# Patient Record
Sex: FEMALE | Race: WHITE
[De-identification: names, ages, dates, MRNs, and addresses within clinical notes are randomized per-mention and may not be internally consistent; named-entity substitution may affect disease eponyms.]

---

## 2019-06-30 ENCOUNTER — HOSPITAL ENCOUNTER (INPATIENT)
Dept: HOSPITAL 74 - JERFT | Age: 29
LOS: 1 days | Discharge: HOME | DRG: 195 | End: 2019-07-01
Attending: INTERNAL MEDICINE | Admitting: INTERNAL MEDICINE
Payer: COMMERCIAL

## 2019-06-30 VITALS — BODY MASS INDEX: 32.5 KG/M2

## 2019-06-30 DIAGNOSIS — J18.9: Primary | ICD-10-CM

## 2019-06-30 DIAGNOSIS — N20.0: ICD-10-CM

## 2019-06-30 DIAGNOSIS — N83.202: ICD-10-CM

## 2019-06-30 LAB
ANION GAP SERPL CALC-SCNC: 7 MMOL/L (ref 8–16)
APPEARANCE UR: (no result)
BASOPHILS # BLD: 0.2 % (ref 0–2)
BILIRUB UR STRIP.AUTO-MCNC: NEGATIVE MG/DL
BUN SERPL-MCNC: 8.7 MG/DL (ref 7–18)
CALCIUM SERPL-MCNC: 9.1 MG/DL (ref 8.5–10.1)
CHLORIDE SERPL-SCNC: 104 MMOL/L (ref 98–107)
CO2 SERPL-SCNC: 28 MMOL/L (ref 21–32)
COLOR UR: (no result)
CREAT SERPL-MCNC: 0.8 MG/DL (ref 0.55–1.3)
DEPRECATED RDW RBC AUTO: 12.4 % (ref 11.6–15.6)
EOSINOPHIL # BLD: 1.1 % (ref 0–4.5)
GLUCOSE SERPL-MCNC: 112 MG/DL (ref 74–106)
HCT VFR BLD CALC: 39.7 % (ref 32.4–45.2)
HGB BLD-MCNC: 13.5 GM/DL (ref 10.7–15.3)
KETONES UR QL STRIP: (no result)
LEUKOCYTE ESTERASE UR QL STRIP.AUTO: NEGATIVE
LYMPHOCYTES # BLD: 14.2 % (ref 8–40)
MCH RBC QN AUTO: 30.6 PG (ref 25.7–33.7)
MCHC RBC AUTO-ENTMCNC: 34.1 G/DL (ref 32–36)
MCV RBC: 89.8 FL (ref 80–96)
MONOCYTES # BLD AUTO: 7.7 % (ref 3.8–10.2)
NEUTROPHILS # BLD: 76.8 % (ref 42.8–82.8)
NITRITE UR QL STRIP: NEGATIVE
PH UR: 5.5 [PH] (ref 5–8)
PLATELET # BLD AUTO: 252 K/MM3 (ref 134–434)
PMV BLD: 8.4 FL (ref 7.5–11.1)
POTASSIUM SERPLBLD-SCNC: 3.7 MMOL/L (ref 3.5–5.1)
PROT UR QL STRIP: (no result)
PROT UR QL STRIP: NEGATIVE
RBC # BLD AUTO: 4.42 M/MM3 (ref 3.6–5.2)
SODIUM SERPL-SCNC: 138 MMOL/L (ref 136–145)
SP GR UR: 1.02 (ref 1.01–1.03)
UROBILINOGEN UR STRIP-MCNC: 1 MG/DL (ref 0.2–1)
WBC # BLD AUTO: 7.1 K/MM3 (ref 4–10)

## 2019-06-30 NOTE — PDOC
History of Present Illness





- General


Chief Complaint: Pain


Stated Complaint: FEVER


Time Seen by Provider: 19 19:02


History Source: Patient


Exam Limitations: No Limitations





- History of Present Illness


Initial Comments: 





19 19:41


Patient is a 28-year-old female with no past medical history here with 

complaints of fever x 5 days.  States was monitoring her temperature because 

she had been feeling ill x 5 days.  Today was took sick - feeling weak, fatigue 

to get out of bed so took her temp and it was 104, associated with night sweats 

and so has come to the emergency room for evaluation.  She has not eaten or 

drank today.  States she went to urgent care - [facility] yesterday, labs were done 

no findings and so was discharged home.  States 5 years ago she had a fever 

like this for 2 weeks and admitted to the hospital but work up was 

inconclusive.  No tick bites, no camping, Denies sore throat, rash, ear ache, 

abd pain, n/v/d, dysuria.  





PMD:  Dr. Verónica Camarena


PMHX: as above


PSOCHX:  neg cig, etoh, drug


ALL:  NKDA





GENERAL/CONSTITUTIONAL:  (+) fever or chills. (+) weakness. No weight change.


HEAD, EYES, EARS, NOSE AND THROAT: No change in vision. No ear pain or 

discharge. No sore throat.


CARDIOVASCULAR: No chest pain or shortness of breath.


RESPIRATORY: No cough, wheezing, or hemoptysis.


GASTROINTESTINAL: No nausea, vomiting, diarrhea or constipation. No rectal 

bleeding.


GENITOURINARY: No dysuria, frequency, or change in urination.


MUSCULOSKELETAL: No joint or muscle swelling or pain. No neck or back pain.


SKIN AND BREASTS: No rash or easy bruising.


NEUROLOGIC: No headache, vertigo, loss of consciousness, or loss of sensation.


PSYCHIATRIC: No depression or anxiety.


ENDOCRINE: No increased thirst. No abnormal weight change.


HEMATOLOGIC/LYMPHATIC: No anemia, easy bleeding, or history of blood clots.


ALLERGIC/IMMUNOLOGIC: No hives or skin allergy. No latex allergy.











GENERAL: The patient is awake, alert, and fully oriented, in no acute distress.


HEAD: Normal with no signs of trauma.


EYES: Pupils equal, round and reactive to light, extraocular movements intact, 

sclera anicteric, conjunctiva clear.


ENT: Ears normal, nares patent, oropharynx clear without exudates.  Moist 

mucous membranes.


NECK: Normal range of motion, supple without lymphadenopathy, JVD, or masses.


LUNGS: Breath sounds equal, clear to auscultation bilaterally.  No wheezes, and 

no crackles.


HEART: Regular rate and rhythm, normal S1 and S2 without murmur, rub.


ABDOMEN: Soft, mild RLQ tenderness, normoactive bowel sounds.  No guarding, no 

rebound.  No masses.


EXTREMITIES: Normal range of motion, no edema.  No clubbing or cyanosis. No 

cords, erythema, or tenderness.


NEUROLOGICAL: Cranial nerves II through XII grossly intact.  Normal speech, 

normal gait.


PSYCH: Normal mood, normal affect.


SKIN: Warm, Dry, normal turgor, no rashes or lesions noted.














Past History





- Past Medical History


Allergies/Adverse Reactions: 


 Allergies











Allergy/AdvReac Type Severity Reaction Status Date / Time


 


No Known Allergies Allergy   Verified 19 19:01











Home Medications: 


Ambulatory Orders





Metronidazole 500 mg PO TID #12 tablet 10/03/14 


levoFLOXacin [Levaquin -] 500 mg PO DAILY #4 tablet 10/03/14 


Non-Formulary 0 mg .ROUTE ASDIR 10/07/14 


Norgestimate-Ethinyl Estradiol [Ortho Tri-Cyclen Lo Tablet] 1 tab PO DAILY 10/07

/14 


Ranitidine [Zantac -] 150 mg PO DAILY #0 tablet 10/07/14 








Anemia: No


Asthma: No


Cancer: No


Cardiac Disorders: No


CVA: No


COPD: No


CHF: No


Dementia: No


Diabetes: No


GI Disorders: No


 Disorders: No


HTN: No


Hypercholesterolemia: No


Liver Disease: No


Seizures: No


Thyroid Disease: No





- Surgical History


Abdominal Surgery: No


Appendectomy: No


Cardiac Surgery: No


Cholecystectomy: No


Lung Surgery: No


Neurologic Surgery: No


Orthopedic Surgery: No





- Suicide/Smoking/Psychosocial Hx


Smoking Status: No


Smoking History: Never smoked


Have you smoked in the past 12 months: No


Number of Cigarettes Smoked Daily: 0


Hx Alcohol Use: No


Drug/Substance Use Hx: No


Substance Use Type: None


Hx Substance Use Treatment: No





*Physical Exam





- Vital Signs


 Last Vital Signs











Temp Pulse Resp BP Pulse Ox


 


 101.3 F H  113 H  20   134/75   98 


 


 19 18:59  19 18:59  19 18:59  19 18:59  19 18:59














ED Treatment Course





- LABORATORY


CBC & Chemistry Diagram: 


 19 20:30





 19 20:30





Medical Decision Making





- Medical Decision Making





19 19:41


Patient is a 28-year-old female with no past medical history here with 

complaints of fever x 5 days.  States was monitoring her temperature because 

she had been feeling ill x 5 days.  Today was took sick - feeling weak, fatigue 

to get out of bed so took her temp and it was 104, associated with night sweats 

and so has come to the emergency room for evaluation.  She has not eaten or 

drank today.  States she went to urgent care - [facility] yesterday, labs were done 

no findings and so was discharged home.  States 5 years ago she had a fever 

like this for 2 weeks and admitted to the hospital but work up was 

inconclusive.  No tick bites, no camping, Denies sore throat, rash, ear ache, 

abd pain, n/v/d, dysuria.





Fever of unknown origin possibly viral


Labs which include lymes, ehrlichiosis, 


IVF








Labs reviewed noted to have a lactic of 2.1, given 2 L of IV fluid





19 23:55


CTscan shows right lower lobe pneumonia given Rocephin 1 g and Zithromax 500 mg 

IV.








Pneumonia/ sepsis


19 00:24


Patient Full Name: TEOFILO SANTO


Patient MRN: [id number]


Accession No: [id number]


Patient : 1990


Reason for Exam: RLQ ABD PAIN





Referring Physician:





Patient Name: GAL RED





THIS IS A PRELIMINARY REPORT FROM IMAGING ON CALL





DATE OF SERVICE: 2019 22:52:31





IMAGES: 484





EXAM: CT abdomen and pelvis with contrast





HISTORY: Right lower quadrant pain





COMPARISON: None.





FINDINGS:


Positive for a partially consolidated right lower lobe infiltrate/pneumonia (

there are some nodular components to the pneumonia and therefore this should be 

followed up to resolution to make sure there is no underlying neoplasm).





No bowel obstruction or inflammation. Normal appendix. Negative for 

diverticulitis or colitis.


No free intraperitoneal air or free fluid.


Normal liver.


Normal spleen.


Normal pancreas


No obvious gallbladder abnormalities.


Normal adrenal glands.


Nonobstructing right renal stone. No right or left urinary tract obstruction. 

No acute renal abnormalities.


2.1 cm left adnexal cyst.


Osseous structures are intact.








One or more of the following dose reduction techniques were used: automated 

exposure control, adjustment of the mA and/or kV according to patient size, use 

of iterative reconstructive technique.





THIS DOCUMENT HAS BEEN ELECTRONICALLY SIGNED





Ezequiel Jiménez MD





2019 00:08 DAILY GORDON. Please call Imaging On Call [phone] ([phone]) with questions.








INTERPRETING RADIOLOGIST:


Ezequiel Jiménez MD


Electronically Signed: 2019 12:08AM EDT


19 00:24


Consult placed for Dr. Lisandro Villafuerte of ID and Dr. Lee for heme/onc





*DC/Admit/Observation/Transfer


Diagnosis at time of Disposition: 


Pneumonia


Qualifiers:


 Pneumonia type: due to unspecified organism Laterality: right Lung location: 

lower lobe of lung Qualified Code(s): J18.1 - Lobar pneumonia, unspecified 

organism





Sepsis


Qualifiers:


 Sepsis type: sepsis due to unspecified organism Qualified Code(s): A41.9 - 

Sepsis, unspecified organism








- Discharge Dispostion


Condition at time of disposition: Stable


Decision to Admit order: Yes





- Referrals


Referrals: 


Verónica Camarena [Primary Care Provider] - 





- Patient Instructions





- Post Discharge Activity

## 2019-06-30 NOTE — PDOC
*Physical Exam





- Vital Signs


 Last Vital Signs











Temp Pulse Resp BP Pulse Ox


 


 101.3 F H  113 H  20   134/75   98 


 


 06/30/19 18:59  06/30/19 18:59  06/30/19 18:59  06/30/19 18:59  06/30/19 18:59














ED Treatment Course





- LABORATORY


CBC & Chemistry Diagram: 


 06/30/19 20:30





 06/30/19 20:30





Medical Decision Making





- Medical Decision Making





06/30/19 20:18





Pt seen by the Advanced Practice Provider under my direct supervision


Pt interviewed and examined


Ancillary studies reviewed





I agree with plan as outlined by the Advanced Practice Provider LAMBERTO Powell





Vital Signs











Temp Pulse Resp BP Pulse Ox


 


 101.3 F H  113 H  20   134/75   98 


 


 06/30/19 18:59  06/30/19 18:59  06/30/19 18:59  06/30/19 18:59  06/30/19 18:59








This is a 28-year-old female patient with no medical problems. Patient reports 

5 days of fever with MAXIMUM TEMPERATURE of 104. She denies nausea, vomiting, 

cough, dysuria, diarrhea or rash. Patient denies any tick exposure. However, 

patient does note some right lower quadrant pain. It is possible that this 

patient may potentially have appendicitis versus diverticulosis or other 

infectious etiology. If the CAT scan and pelvis is unremarkable, should 

consider other pathologies such as bacteremia, tick borne diseases such as 

Babesia, early ketosis, Lyme's. She draw those titers. Initiate empiric 

antibiotics with IV ceftriaxone admit the patient to the hospital for further 

management. Patient would likely benefit from infectious disease consultation.


06/30/19 20:20





GENERAL: Awake, alert, and fully oriented, in no acute distress


HEAD: No signs of trauma


EYES: EOMI, sclera anicteric, conjunctiva clear


ENT: Auricles normal inspection, hearing grossly normal, nares patent, 

oropharynx clear without exudates, TMs clear. Moist mucosa


NECK: Normal ROM, supple,


LUNGS: Breath sounds equal, clear to auscultation bilaterally.  No wheezes, and 

no crackles


HEART: Regular rate and rhythm, normal S1 and S2, no murmurs, rubs or gallops


ABDOMEN: Soft, TTP RLQ.  No guarding, no rebound.  No masses


EXTREMITIES: Normal range of motion, no edema.  No clubbing or cyanosis. No 

cords, erythema, or tenderness


NEUROLOGICAL: Cranial nerves II through XII grossly intact.  Normal speech, 

normal gait


SKIN: Warm, Dry, normal turgor, no rashes or lesions noted.


06/30/19 23:00





 CBC, BMP





 06/30/19 20:30 





 06/30/19 20:30 





 CMP











Sodium  138 mmol/L (136-145)   06/30/19  20:30    


 


Potassium  3.7 mmol/L (3.5-5.1)   06/30/19  20:30    


 


Chloride  104 mmol/L ()   06/30/19  20:30    


 


Carbon Dioxide  28 mmol/L (21-32)   06/30/19  20:30    


 


Anion Gap  7 MMOL/L (8-16)  L  06/30/19  20:30    


 


BUN  8.7 mg/dL (7-18)   06/30/19  20:30    


 


Creatinine  0.8 mg/dL (0.55-1.3)   06/30/19  20:30    


 


Est GFR (CKD-EPI)AfAm  115.47   06/30/19  20:30    


 


Est GFR (CKD-EPI)NonAf  99.63   06/30/19  20:30    


 


Random Glucose  112 mg/dL ()  H  06/30/19  20:30    


 


Lactic Acid  2.1 mmol/L (0.4-2.0)  H  06/30/19  20:30    


 


Calcium  9.1 mg/dL (8.5-10.1)   06/30/19  20:30    











07/01/19 00:21


CT scan shows Right lower PNA, but cannot rule out malignancy.


Pt will need ceftriaxone and azithromycin.


Needs ID and heme/onc consultation.


Admit.





*DC/Admit/Observation/Transfer


Diagnosis at time of Disposition: 


 Pneumonia, Sepsis








- Discharge Dispostion


Condition at time of disposition: Stable





- Referrals


Referrals: 


Verónica Camarena [Primary Care Provider] - 





- Patient Instructions





- Post Discharge Activity

## 2019-07-01 VITALS — TEMPERATURE: 98.2 F | HEART RATE: 75 BPM | SYSTOLIC BLOOD PRESSURE: 107 MMHG | DIASTOLIC BLOOD PRESSURE: 65 MMHG

## 2019-07-01 NOTE — DS
Physical Exam: 


SUBJECTIVE: Patient seen and examined. Unchanged form prior interview. 








OBJECTIVE:





 Vital Signs











 Period  Temp  Pulse  Resp  BP Sys/Redd  Pulse Ox


 


 Last 24 Hr  98.2 F-101.3 F    19-20  107-134/65-75  98-98








PHYSICAL EXAM





GENERAL: Awake, alert, and fully oriented, in no acute distress.


HEAD: Normal with no signs of trauma.


EYES: Pupils equal, round and reactive to light, extraocular movements intact, 

sclera anicteric, conjunctiva clear. 


EARS, NOSE, THROAT: Ears normal, nares patent, oropharynx clear without 

exudates. Moist mucous membranes.


NECK: Normal range of motion, supple without lymphadenopathy, JVD, or masses.


LUNGS: Breath sounds equal, clear to auscultation bilaterally. No wheezes, and 

no crackles. No accessory muscle use.


HEART: Regular rate and rhythm, normal S1 and S2 without murmur


ABDOMEN: Soft, nontender, not distended, normoactive bowel sounds, no guarding, 

no rebound, no masses.  


MUSCULOSKELETAL: Normal range of motion at all joints. No bony deformities or 

tenderness. No CVA tenderness.


UPPER EXTREMITIES: 2+ radial pulses, warm, well-perfused. No cyanosis. No 

clubbing. No peripheral edema.


LOWER EXTREMITIES: 2+ pulses, warm, well-perfused. No calf tenderness. No 

peripheral edema. 


NEUROLOGICAL:  Normal speech. Normal gait.


PSYCHIATRIC: Cooperative. Good eye contact. Appropriate mood and affect.


SKIN: Warm, dry, normal turgor, no rashes or lesions noted, normal capillary 

refill. 





LABS


 Laboratory Results - last 24 hr











  06/30/19 06/30/19 06/30/19





  20:30 20:30 20:30


 


WBC  7.1  


 


RBC  4.42  


 


Hgb  13.5  


 


Hct  39.7  D  


 


MCV  89.8  


 


MCH  30.6  


 


MCHC  34.1  


 


RDW  12.4  


 


Plt Count  252  D  


 


MPV  8.4  D  


 


Absolute Neuts (auto)  5.5  


 


Neutrophils %  76.8  D  


 


Lymphocytes %  14.2  D  


 


Monocytes %  7.7  


 


Eosinophils %  1.1  D  


 


Basophils %  0.2  D  


 


Nucleated RBC %  0  


 


Sodium   138 


 


Potassium   3.7 


 


Chloride   104 


 


Carbon Dioxide   28 


 


Anion Gap   7 L 


 


BUN   8.7 


 


Creatinine   0.8 


 


Est GFR (CKD-EPI)AfAm   115.47 


 


Est GFR (CKD-EPI)NonAf   99.63 


 


Random Glucose   112 H 


 


Lactic Acid   


 


Calcium   9.1 


 


Urine Color    Dk yellow


 


Urine Appearance    Cloudy


 


Urine pH    5.5


 


Ur Specific Gravity    1.025


 


Urine Protein    Trace


 


Urine Glucose (UA)    Negative


 


Urine Ketones    Trace H


 


Urine Blood    Negative


 


Urine Nitrite    Negative


 


Urine Bilirubin    Negative


 


Urine Urobilinogen    1.0


 


Ur Leukocyte Esterase    Negative


 


Urine HCG, Qual    Negative














  06/30/19 07/01/19





  20:30 00:10


 


WBC  


 


RBC  


 


Hgb  


 


Hct  


 


MCV  


 


MCH  


 


MCHC  


 


RDW  


 


Plt Count  


 


MPV  


 


Absolute Neuts (auto)  


 


Neutrophils %  


 


Lymphocytes %  


 


Monocytes %  


 


Eosinophils %  


 


Basophils %  


 


Nucleated RBC %  


 


Sodium  


 


Potassium  


 


Chloride  


 


Carbon Dioxide  


 


Anion Gap  


 


BUN  


 


Creatinine  


 


Est GFR (CKD-EPI)AfAm  


 


Est GFR (CKD-EPI)NonAf  


 


Random Glucose  


 


Lactic Acid  2.1 H  0.9


 


Calcium  


 


Urine Color  


 


Urine Appearance  


 


Urine pH  


 


Ur Specific Gravity  


 


Urine Protein  


 


Urine Glucose (UA)  


 


Urine Ketones  


 


Urine Blood  


 


Urine Nitrite  


 


Urine Bilirubin  


 


Urine Urobilinogen  


 


Ur Leukocyte Esterase  


 


Urine HCG, Qual  











HOSPITAL COURSE:





Date of Admission:07/01/19





Date of Discharge: 07/01/19








Pt. observed for fevers. On admission Pt. had fever to 101. Given tylenol, 

Ceftriaxone, and Azithormycin. PT. had BCx., UCx., and 2L NS. CT abdomen Pelvis 

showed RLL infiltrate as detailed below. Pt. discharged on Levaquin for 7 days. 

Pt. instructed to follow up as detailed below. Hospital course discussed and 

agreed upon wit Pt. and medical staff.  


Minutes to complete discharge: 30





Discharge Summary


Reason For Visit: SEPSIS, PNEUMONIA


Current Active Problems





Pneumonia (Acute)


Sepsis (Acute)








Condition: Stable





- Instructions


Diet, Activity, Other Instructions: 


You came in for fevers over the last 5 days 





We did a CT scan of your chest and found that you have Pneumonia. CT Scan also 

showed you have a non-obstructing renal stone, and a 2.1cm left ovarian cyst. 





We are starting you on an antibiotic Levaquin 750 mg. 


Please take 1 pill ONCE a day for 7 days.





Please follow up with your Pulmonologist, Dr. Crook within 1 week. Please 

discuss having a repeat CT Scan in the future to make sure your Pneumonia 

resolves appropriately. 


Please follow up with your Primary Care Physician, Dr. Camarena, within 1 week. 

Please have an EKG done at that time.  





Please return to the ED if you are having worsening or continued fevers, notice 

blood in your sputum, develop worsening abdominal pain, nausea or vomiting that 

won't stop, diarrhea that wont stop or have any concerning symptoms.   


Referrals: 


Roge Crook MD [Staff Physician] - 1 Week


Verónica Camarena [Primary Care Provider] - 1 Week


Disposition: HOME





- Home Medications


Comprehensive Discharge Medication List: 


Ambulatory Orders





Norgestimate-Ethinyl Estradiol [Ortho Tri-Cyclen Lo Tablet] 1 tab PO DAILY 10/07

/14 


Ranitidine [Zantac -] 150 mg PO DAILY #0 tablet 10/07/14 








This patient is new to me today: Yes


Date on this admission: 07/01/19


Emergency Visit: Yes


ED Registration Date: 07/01/19


Care time: The patient presented to the Emergency Department on the above date 

and was hospitalized for further evaluation of their emergent condition.


Critical Care patient: No





- Discharge Referral


Referred to [facility] No

## 2019-07-01 NOTE — EKG
Test Reason : 

Blood Pressure : ***/*** mmHG

Vent. Rate : 086 BPM     Atrial Rate : 086 BPM

   P-R Int : 200 ms          QRS Dur : 094 ms

    QT Int : 382 ms       P-R-T Axes : 054 058 039 degrees

   QTc Int : 457 ms

 

NORMAL SINUS RHYTHM

NORMAL ECG

WHEN COMPARED WITH ECG OF 01-OCT-2014 23:32,

NO SIGNIFICANT CHANGE WAS FOUND

Confirmed by GER CESPEDES MD ([id number]) on 7/1/2019 9:33:22 AM

 

Referred By:             Confirmed By:GER CESPEDES MD

## 2019-07-01 NOTE — PN
Teaching Attending Note


Name of Resident: Ezequiel Garcia





ATTENDING PHYSICIAN STATEMENT





I saw and evaluated the patient.


I reviewed the resident's note and discussed the case with the resident.


I agree with the resident's findings and plan as documented.








SUBJECTIVE:


5 days fever Tmax 104 at home; APAP at home last at 5P noted to be febrile to 

101.3F here and to be slightly tachy to 100s which resolved.  She had 

unexplained fevers several years ago.  No rashes, recent travel, incarceration; 

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  No cough, SOB, etc.  She 

was found to have a normal white count and unremarkable CMP.  Documented 

abdominal pain but I didn't get that story from her, and denies nausea, etc.  

CT shows a partially consolidated RLL infiltrate (some nodular components to 

the PNA so recomneded that we FU after resolution with followup imaging to make 

sure no underlying neoplasm).  Unremarkable intrabdomnal contents; 

nonobstructing R-renal stone with 2.1cm L-adenexal cyst.  PSI endorses risk 

class 1 which endorses outpatient tx and CURB-65 is 0 which endorses low risk 

patient.  Discussed with patient; giving additional L of fluid and PRN 

analgesia.  She can followup the pneumonia as an OP with Dr. Crook (referral 

given) and complete a course of Levaquin for CAP (checking QTc-EKG pending).  

She does not have obvious neoplastic disease and doesn't require inpatient 

oncology consultation.  








10 sys ROS done and negative aside from HPI


PMH, PSH, FH, SH reviewed


 Home Medications











 Medication  Instructions  Recorded


 


Metronidazole 500 mg PO TID #12 tablet 10/03/14


 


levoFLOXacin [Levaquin -] 500 mg PO DAILY #4 tablet 10/03/14


 


Non-Formulary 0 mg .ROUTE ASDIR 10/07/14


 


Norgestimate-Ethinyl Estradiol 1 tab PO DAILY 10/07/14





[Ortho Tri-Cyclen Lo Tablet]  


 


Ranitidine [Zantac -] 150 mg PO DAILY #0 tablet 10/07/14














OBJECTIVE:


VS, labs, imaging reviewed


NAD, AAO, resting comfortably in bed


NC AT EOMI PERRLA


RRR s1/2 no mgr


Relatively benign lung exam; w/ sym exp


CN2-12 wnl, no fnd


Normal mood, appropriate behavior.





EKG pending


CXR reviewed


CT prelim read reviewed





ASSESSMENT AND PLAN:


Patient presents for fever of 5 days found to have RLL infiltrate suspicious 

for pneumonia; CURB-65 is 0 and PSI endorses outpatient treatment for CAP.  

There was some nodularity to the pneumonia per the prelim read and it should be 

followed as an outpatient and this was explained to her and she is in 

agreement.  Aside from the fever, she endorses no other symptoms to myself and 

the resident team.  She has an ovarian cyst and she can followup this with 

OBGYN.





1) CAP


-Atypical in the sense so pulmonary symptoms, but present on imaging and would 

explain the fevers.  Can treat as OP with a 7 day course of levaquin (resident 

team to followup QTc).  Followup imaging needed.  Referral to pulmonary given 

as atypical for 29 y/o to have a pneumonia. 


-If she remains improved she may be discharged home with close PCP followup; 

warning signs when to return to the hospital discussed and she verbalizes 

understanding.





2) Ovarian Cyst


-FU with PCP for US.





Dispo: If remains improved medicine will discharge home from the ER.

## 2019-07-01 NOTE — HP
CHIEF COMPLAINT: Fever 





PCP: Dr. Verónica Camarena





HISTORY OF PRESENT ILLNESS: Pt. is a 28 y.o. F w/o PMHx. presents with 5 days 

of intermittent fevers. Pt. states that she measured her temperature this 

morning and it was 101, took Advil and then remeasures at 5pm and its was 104 (

maximum). Pt. states that she measured her temperatures in her ear. Pt. 

endorses associated symptoms of inght sweats over the last 5 days, worse over 

the last 3 days and associated chills. Pt. states that something like this last 

happened 4 years ago and she states that after 2 days of workup, there no was 

reason identified for the fever. On that work up Pt. was found to have positive 

MAGGY (1:320). Pt. had Dengue fever 5 years ago. Pt. endorses headache that 

started while in ED and decreased PO intake over the last few days. Pt. denies 

any constipation, rash, cough, chest pain, shortness of breath or any other 

symptoms.  








ER course was notable for:


(1)Tick Illnes w/u, Tylenol, NS 2 L, 


(2)Azithro/ Ceftriaxone


(3)BCx. UA/UCx., CT A/P





Recent Travel: No





PAST MEDICAL HISTORY: Dengue Fever 





PAST SURGICAL HISTORY: EGD-moderate gastritis 





Social History:


Smoking: None 


Alcohol: None 


Drugs: None 





Family History: Mom-HTN, Dad-DM, Grandfather-Stomach CA


Allergies





No Known Allergies Allergy (Verified 06/30/19 19:01)


 








HOME MEDICATIONS:


 Home Medications











 Medication  Instructions  Recorded


 


Metronidazole 500 mg PO TID #12 tablet 10/03/14


 


levoFLOXacin [Levaquin -] 500 mg PO DAILY #4 tablet 10/03/14


 


Non-Formulary 0 mg .ROUTE ASDIR 10/07/14


 


Norgestimate-Ethinyl Estradiol 1 tab PO DAILY 10/07/14





[Ortho Tri-Cyclen Lo Tablet]  


 


Ranitidine [Zantac -] 150 mg PO DAILY #0 tablet 10/07/14








REVIEW OF SYSTEMS


As above 








PHYSICAL EXAMINATION


 Vital Signs - 24 hr











  06/30/19 06/30/19





  18:59 20:30


 


Temperature 101.3 F H 


 


Pulse Rate 113 H 


 


Respiratory 20 





Rate  


 


Blood Pressure 134/75 


 


O2 Sat by Pulse 98 98





Oximetry (%)  











GENERAL: Awake, alert, and fully oriented, in no acute distress.


HEAD: Normal with no signs of trauma.


EYES: Pupils equal, round and reactive to light, extraocular movements intact, 

sclera anicteric, conjunctiva clear. 


EARS, NOSE, THROAT: Ears normal, nares patent, oropharynx clear without 

exudates. Moist mucous membranes.


NECK: Normal range of motion, supple without lymphadenopathy, JVD, or masses.


LUNGS: Breath sounds equal, clear to auscultation bilaterally. No wheezes, and 

no crackles. No accessory muscle use.


HEART: Regular rate and rhythm, normal S1 and S2 without murmur


ABDOMEN: Soft, nontender, not distended, normoactive bowel sounds, no guarding, 

no rebound, no masses.  


MUSCULOSKELETAL: Normal range of motion at all joints. No bony deformities or 

tenderness. No CVA tenderness.


UPPER EXTREMITIES: 2+ radial pulses, warm, well-perfused. No cyanosis. No 

clubbing. No peripheral edema.


LOWER EXTREMITIES: 2+ pulses, warm, well-perfused. No calf tenderness. No 

peripheral edema. 


NEUROLOGICAL:  Normal speech. Normal gait.


PSYCHIATRIC: Cooperative. Good eye contact. Appropriate mood and affect.


SKIN: Warm, dry, normal turgor, no rashes or lesions noted, normal capillary 

refill. 





 Laboratory Results - last 24 hr











  06/30/19 06/30/19 06/30/19





  20:30 20:30 20:30


 


WBC  7.1  


 


RBC  4.42  


 


Hgb  13.5  


 


Hct  39.7  D  


 


MCV  89.8  


 


MCH  30.6  


 


MCHC  34.1  


 


RDW  12.4  


 


Plt Count  252  D  


 


MPV  8.4  D  


 


Absolute Neuts (auto)  5.5  


 


Neutrophils %  76.8  D  


 


Lymphocytes %  14.2  D  


 


Monocytes %  7.7  


 


Eosinophils %  1.1  D  


 


Basophils %  0.2  D  


 


Nucleated RBC %  0  


 


Sodium   138 


 


Potassium   3.7 


 


Chloride   104 


 


Carbon Dioxide   28 


 


Anion Gap   7 L 


 


BUN   8.7 


 


Creatinine   0.8 


 


Est GFR (CKD-EPI)AfAm   115.47 


 


Est GFR (CKD-EPI)NonAf   99.63 


 


Random Glucose   112 H 


 


Lactic Acid   


 


Calcium   9.1 


 


Urine Color    Dk yellow


 


Urine Appearance    Cloudy


 


Urine pH    5.5


 


Ur Specific Gravity    1.025


 


Urine Protein    Trace


 


Urine Glucose (UA)    Negative


 


Urine Ketones    Trace H


 


Urine Blood    Negative


 


Urine Nitrite    Negative


 


Urine Bilirubin    Negative


 


Urine Urobilinogen    1.0


 


Ur Leukocyte Esterase    Negative


 


Urine HCG, Qual    Negative














  06/30/19 07/01/19





  20:30 00:10


 


WBC  


 


RBC  


 


Hgb  


 


Hct  


 


MCV  


 


MCH  


 


MCHC  


 


RDW  


 


Plt Count  


 


MPV  


 


Absolute Neuts (auto)  


 


Neutrophils %  


 


Lymphocytes %  


 


Monocytes %  


 


Eosinophils %  


 


Basophils %  


 


Nucleated RBC %  


 


Sodium  


 


Potassium  


 


Chloride  


 


Carbon Dioxide  


 


Anion Gap  


 


BUN  


 


Creatinine  


 


Est GFR (CKD-EPI)AfAm  


 


Est GFR (CKD-EPI)NonAf  


 


Random Glucose  


 


Lactic Acid  2.1 H  0.9


 


Calcium  


 


Urine Color  


 


Urine Appearance  


 


Urine pH  


 


Ur Specific Gravity  


 


Urine Protein  


 


Urine Glucose (UA)  


 


Urine Ketones  


 


Urine Blood  


 


Urine Nitrite  


 


Urine Bilirubin  


 


Urine Urobilinogen  


 


Ur Leukocyte Esterase  


 


Urine HCG, Qual  











ASSESSMENT/PLAN:


Pt. is a 28 y.o. F w/o PMHx. presents with 5 days of intermittent fevers. Pt. 

states that she measured her temperature this morning and it was 101, took 

Advil and then remeasures at 5pm and its was 104 (maximum).





#Community Acquired PNA


CT A/P- RLL consolidiation w/ pulmonary nodules?, non-obstructing R. renal stone

, 2.1cm L adnexal cyst 


Given Ceftriaxone and Azithromycin in ED


LA: 2.1-->0.9


No elevation in WBC 


tachycardic (113)


PSI: 1


CURB: 0


Given lack of significant symptoms aside from fevers, can transition to PO Abx. 

(Levaquin) 





#Ovarian Cyst 


Pt. can f/u outpatient for evaluation as well as continued evaluation of 

fibroid size. 





#FEN


no IVF, encourage PO intake


monitor lytes and replete as needed


Regular Diet 





#DVT Ppx


Early ambulation





Visit type





- Emergency Visit


Emergency Visit: Yes


ED Registration Date: 07/01/19


Care time: The patient presented to the Emergency Department on the above date 

and was hospitalized for further evaluation of their emergent condition.





- New Patient


This patient is new to me today: Yes


Date on this admission: 07/01/19





- Critical Care


Critical Care patient: No

## 2019-07-03 LAB
B MICROTI IGG SER QL: (no result)
B MICROTI IGM SER-ACNC: (no result)